# Patient Record
Sex: FEMALE | Race: WHITE | NOT HISPANIC OR LATINO
[De-identification: names, ages, dates, MRNs, and addresses within clinical notes are randomized per-mention and may not be internally consistent; named-entity substitution may affect disease eponyms.]

---

## 2023-07-07 PROBLEM — Z00.00 ENCOUNTER FOR PREVENTIVE HEALTH EXAMINATION: Status: ACTIVE | Noted: 2023-07-07

## 2023-07-28 ENCOUNTER — APPOINTMENT (OUTPATIENT)
Dept: PULMONOLOGY | Facility: CLINIC | Age: 68
End: 2023-07-28
Payer: COMMERCIAL

## 2023-07-28 VITALS
DIASTOLIC BLOOD PRESSURE: 48 MMHG | HEART RATE: 75 BPM | WEIGHT: 208 LBS | SYSTOLIC BLOOD PRESSURE: 108 MMHG | OXYGEN SATURATION: 95 % | BODY MASS INDEX: 31.52 KG/M2 | HEIGHT: 68 IN | RESPIRATION RATE: 16 BRPM

## 2023-07-28 DIAGNOSIS — J45.909 UNSPECIFIED ASTHMA, UNCOMPLICATED: ICD-10-CM

## 2023-07-28 DIAGNOSIS — K74.60 UNSPECIFIED CIRRHOSIS OF LIVER: ICD-10-CM

## 2023-07-28 DIAGNOSIS — Z80.0 FAMILY HISTORY OF MALIGNANT NEOPLASM OF DIGESTIVE ORGANS: ICD-10-CM

## 2023-07-28 DIAGNOSIS — Z82.5 FAMILY HISTORY OF ASTHMA AND OTHER CHRONIC LOWER RESPIRATORY DISEASES: ICD-10-CM

## 2023-07-28 DIAGNOSIS — M25.9 JOINT DISORDER, UNSPECIFIED: ICD-10-CM

## 2023-07-28 DIAGNOSIS — I10 ESSENTIAL (PRIMARY) HYPERTENSION: ICD-10-CM

## 2023-07-28 DIAGNOSIS — K21.9 GASTRO-ESOPHAGEAL REFLUX DISEASE W/OUT ESOPHAGITIS: ICD-10-CM

## 2023-07-28 DIAGNOSIS — K76.82 HEPATIC ENCEPHALOPATHY: ICD-10-CM

## 2023-07-28 PROCEDURE — 99204 OFFICE O/P NEW MOD 45 MIN: CPT

## 2023-07-28 RX ORDER — FUROSEMIDE 80 MG/1
TABLET ORAL
Refills: 0 | Status: ACTIVE | COMMUNITY

## 2023-07-28 RX ORDER — ASPIRIN 81 MG/1
81 TABLET ORAL
Refills: 0 | Status: ACTIVE | COMMUNITY

## 2023-07-28 RX ORDER — ENALAPRIL MALEATE 10 MG/1
10 TABLET ORAL
Refills: 0 | Status: ACTIVE | COMMUNITY

## 2023-07-28 RX ORDER — MONTELUKAST SODIUM 10 MG/1
10 TABLET, FILM COATED ORAL
Refills: 0 | Status: ACTIVE | COMMUNITY

## 2023-07-28 RX ORDER — PRIMIDONE 50 MG/1
50 TABLET ORAL
Refills: 0 | Status: ACTIVE | COMMUNITY

## 2023-07-28 RX ORDER — LORAZEPAM 0.5 MG/1
0.5 TABLET ORAL
Refills: 0 | Status: ACTIVE | COMMUNITY

## 2023-07-28 RX ORDER — LEVOTHYROXINE SODIUM 0.07 MG/1
75 TABLET ORAL
Refills: 0 | Status: ACTIVE | COMMUNITY

## 2023-07-28 RX ORDER — AMLODIPINE BESYLATE 5 MG/1
5 TABLET ORAL
Refills: 0 | Status: ACTIVE | COMMUNITY

## 2023-07-28 RX ORDER — FLUTICASONE FUROATE, UMECLIDINIUM BROMIDE AND VILANTEROL TRIFENATATE 200; 62.5; 25 UG/1; UG/1; UG/1
200-62.5-25 POWDER RESPIRATORY (INHALATION)
Qty: 1 | Refills: 5 | Status: ACTIVE | OUTPATIENT
Start: 2023-07-28

## 2023-07-28 RX ORDER — ELECTROLYTES/DEXTROSE
SOLUTION, ORAL ORAL
Refills: 0 | Status: ACTIVE | COMMUNITY

## 2023-07-28 RX ORDER — SPIRONOLACTONE 25 MG/1
25 TABLET ORAL
Refills: 0 | Status: ACTIVE | COMMUNITY

## 2023-07-28 RX ORDER — LACTULOSE 10 G/15ML
20 SOLUTION ORAL
Refills: 0 | Status: ACTIVE | COMMUNITY

## 2023-07-28 RX ORDER — DULOXETINE HYDROCHLORIDE 60 MG/1
60 CAPSULE, DELAYED RELEASE ORAL
Refills: 0 | Status: ACTIVE | COMMUNITY

## 2023-07-28 RX ORDER — FAMOTIDINE 20 MG/1
20 TABLET, FILM COATED ORAL
Refills: 0 | Status: ACTIVE | COMMUNITY

## 2023-07-28 RX ORDER — FENTANYL 12 UG/H
12 PATCH, EXTENDED RELEASE TRANSDERMAL
Refills: 0 | Status: ACTIVE | COMMUNITY

## 2023-07-28 RX ORDER — MIRTAZAPINE 15 MG/1
15 TABLET, FILM COATED ORAL
Refills: 0 | Status: ACTIVE | COMMUNITY

## 2023-07-28 RX ORDER — ALBUTEROL SULFATE 2.5 MG/3ML
(2.5 MG/3ML) SOLUTION RESPIRATORY (INHALATION)
Refills: 0 | Status: ACTIVE | COMMUNITY

## 2023-07-28 RX ORDER — ALBUTEROL SULFATE 90 UG/1
108 (90 BASE) AEROSOL, METERED RESPIRATORY (INHALATION)
Qty: 1 | Refills: 5 | Status: ACTIVE | OUTPATIENT
Start: 2023-07-28

## 2023-07-28 RX ORDER — GUAIFENESIN 100 MG/5ML
100 LIQUID (ML) ORAL
Refills: 0 | Status: ACTIVE | COMMUNITY

## 2023-07-28 RX ORDER — NALOXEGOL OXALATE 12.5 MG/1
12.5 TABLET, FILM COATED ORAL
Refills: 0 | Status: ACTIVE | COMMUNITY

## 2023-07-28 RX ORDER — LAMOTRIGINE 25 MG/1
25 TABLET, FOR SUSPENSION ORAL
Refills: 0 | Status: ACTIVE | COMMUNITY

## 2023-07-28 NOTE — END OF VISIT
[FreeTextEntry3] : Case reviewed with  [Time Spent: ___ minutes] : I have spent [unfilled] minutes of time on the encounter.

## 2023-07-28 NOTE — CONSULT LETTER
[Dear  ___] : Dear  [unfilled], [Consult Letter:] : I had the pleasure of evaluating your patient, [unfilled]. [Please see my note below.] : Please see my note below. [Consult Closing:] : Thank you very much for allowing me to participate in the care of this patient.  If you have any questions, please do not hesitate to contact me. [Sincerely,] : Sincerely, [FreeTextEntry3] : Matthew Caraballo MD FCCP\par Pulmonary/Critical Care/Sleep Medicine\par Department of Internal Medicine\par \par [facility] [employer]\par

## 2023-07-28 NOTE — DISCUSSION/SUMMARY
[FreeTextEntry1] : 67-year-old female seen today for the above.  Patient's symptoms are consistent with decompensated asthma while off all bronchodilators except for rescue inhalers.  Complicating the above appears to be untreated sleep apnea which may be precipitating reflux and subsequent asthma symptoms during the course of the night.

## 2023-07-28 NOTE — HISTORY OF PRESENT ILLNESS
[Awakes Unrefreshed] : awakes unrefreshed [Daytime Somnolence] : daytime somnolence [Snoring] : snoring [Witnessed Apneas] : witnessed apneas [TextBox_4] : 67-year-old non-smoker seen today for pulmonary evaluation.  Patient has carried a history of asthma since the age of 33.  Patient has been maintained only on montelukast and has not had her short acting bronchodilators since admission to [facility] nursing facility.  Over the course of the last month patient has complaint of symptoms of cough worse at night with nocturnal awakenings.  She does respond well to a nebulizer with near complete resolution.  She denies any baseline complaints and denies any significant complaints of cough or wheeze during the course of the day.  She does note some minor postnasal drip.  No history of heartburn.  No HENT no leg edema paroxysmal nocturnal dyspnea or orthopnea.  Prior diagnosis of obstructive sleep apnea [Awakes with Dry Mouth] : does not awaken with dry mouth [Awakes with Headache] : does not awaken with headache [TextBox_77] : 1782 [TextBox_79] : 1100 [TextBox_81] : 30 [TextBox_83] : 1hr [TextBox_89] : 2 [ESS] : 16

## 2023-08-04 ENCOUNTER — OUTPATIENT (OUTPATIENT)
Dept: OUTPATIENT SERVICES | Facility: HOSPITAL | Age: 68
LOS: 1 days | End: 2023-08-04

## 2023-08-04 DIAGNOSIS — G47.33 OBSTRUCTIVE SLEEP APNEA (ADULT) (PEDIATRIC): ICD-10-CM

## 2023-08-17 ENCOUNTER — APPOINTMENT (OUTPATIENT)
Dept: ORTHOPEDIC SURGERY | Facility: CLINIC | Age: 68
End: 2023-08-17
Payer: COMMERCIAL

## 2023-08-17 VITALS
SYSTOLIC BLOOD PRESSURE: 124 MMHG | BODY MASS INDEX: 31.52 KG/M2 | HEIGHT: 68 IN | HEART RATE: 71 BPM | DIASTOLIC BLOOD PRESSURE: 63 MMHG | WEIGHT: 208 LBS

## 2023-08-17 PROCEDURE — 99203 OFFICE O/P NEW LOW 30 MIN: CPT

## 2023-08-17 NOTE — PHYSICAL EXAM
[Wheelchair] : uses a wheelchair [de-identified] : GENERAL APPEARANCE: Patient is in a wheelchair, obese, alert and oriented x3, HEENT: Normocephalic, extraocular eye motion intact. Nasal septum midline. Oral cavity clear. External auditory canal clear.  RESPIRATORY: Breath sounds clear and audible in all lobes. No wheezing, No accessory muscle use. CARDIOVASCULAR: No apparent abnormalities. No lower leg edema. No varicosities. Pedal pulses are palpable. NEUROLOGIC: Sensation is normal, no muscle weakness in the upper or lower extremities. DERMATOLOGIC: No apparent skin lesions, moist, warm, no rash. SPINE: There is pain with range of motion of the spine MUSCULOSKELETAL: Hands, wrists, and elbows are normal and move freely, shoulders are normal and move freely.  PSYCHIATRIC: Oriented to person, place, and time, insight and judgement were intact and the affect was normal. [de-identified] : Patient is nonambulatory Left hip exam showed moderate groin pain with SLR, ROM is full flexion with 30 degrees external and 10 degrees internal with pain, HARVEY positive, FADIR positive. 5/5 motor strength in bilateral lower extremities. Sensory: Intact in bilateral lower extremities. DTRs: Biceps, brachioradialis, triceps, patellar, ankle and plantar 2+ and symmetric bilaterally. Pulses: dorsalis pedis, posterior tibial, femoral, popliteal, and radial 2+ and symmetric bilaterally. [de-identified] : X-rays were unable to be obtained in the office today as patient is wheelchair-bound and requires a Moises lift for transfers however outside report available from the nursing home shows severe degenerative changes of the left hip with possible subluxation  06/27/2023 - [facility]: MRI Left Lower Extremity Without Intravenous Contrast Hip - IMPRESSION: Severe left hip degenerative arthritis with bone-on-bone contact. Mild effusion.

## 2023-08-17 NOTE — ADDENDUM
[FreeTextEntry1] : This note was written by Carrie Palmer, acting as the  for Dr. Howell. This note accurately reflects the work and decisions made by Dr. Howell.

## 2023-08-17 NOTE — DISCUSSION/SUMMARY
[PRN] : PRN [Medication Risks Reviewed] : Medication risks reviewed [Surgical risks reviewed] : Surgical risks reviewed [de-identified] : Patient is a 67-year-old female here today for evaluation of left hip pain with her .  I thorough discussion with her about the nature of her condition.  We did discuss that she is unable to obtain imaging today and I am unable to give her a complete evaluation due to the fact that I am unable to review her MRI and I am unable to review images of her hip.  However based on reports that are available to me she does have significant bone-on-bone osteoarthritis.  There is no evidence of fracture.  I do think that she would benefit from physical therapy and range of motion and strengthening.  I have advised her that she may weight-bear.  I have advised him to follow-up with her treating orthopedist in order to evaluate and review the MRI imaging with him.  Patient and her  are in agreement with this.  I will see her back on an as-needed basis.  All questions were asked and answered

## 2023-08-17 NOTE — REASON FOR VISIT
[Initial Consultation] : an initial consultation for [Other: ____] : [unfilled] [Family Member] : family member

## 2023-08-17 NOTE — HISTORY OF PRESENT ILLNESS
[Pain Location] : pain [] : left hip [Worsening] : worsening [___ mths] : [unfilled] month(s) ago [Standing] : standing [Constant] : ~He/She~ states the symptoms seem to be constant [Hip Movement] : worsened by hip movement [Sitting] : worsened by sitting [Running] : worsened by running [Walking] : worsened by walking [Physical Therapy] : relieved by physical therapy [de-identified] : 67 year old female patient presents today for an initial consultation for left hip pain. The patient's  does not remember the name of her previous orthopedist however he states that she was referred for this MRI by this physician.  The patient needed an MRI but could not walk to the machine at the first location she was sent to. The patient then had an MRI done at [facility]. However, they could not send the report to [facility], nor does [facility] have access to her images. The x-ray was inconclusive and [facility] did not see any fractures. The patient has not walked in 4-5 months. She was already not walking before the hip pain began. Previous doctors advised her not to weight bare on the left leg to prevent further damage. She was attending physical therapy. The patient discovered her liver cirrhosis in 2014 from taking prescription drugs over many years. She also has fatty liver but did not treat it. The patient was supposed to have fusion from the middle of her back down her spine, but the surgeon decided against the procedure due to the state of her liver.  As per her  she is on a fentanyl patch for her back pain.  Also use a lidocaine patch.  Was able to transfer with the assistance of facility staff prior to being told not to put weight on her left leg.  Denies any falls or trauma.  Has significant pain in the groin has been present for a long period of time.  Denies any significant changes [de-identified] : Fentanyl patch, Lidocaine patch

## 2023-10-05 DIAGNOSIS — G47.33 OBSTRUCTIVE SLEEP APNEA (ADULT) (PEDIATRIC): ICD-10-CM

## 2023-10-20 DIAGNOSIS — M16.12 UNILATERAL PRIMARY OSTEOARTHRITIS, LEFT HIP: ICD-10-CM
